# Patient Record
Sex: FEMALE | Race: WHITE | NOT HISPANIC OR LATINO | URBAN - METROPOLITAN AREA
[De-identification: names, ages, dates, MRNs, and addresses within clinical notes are randomized per-mention and may not be internally consistent; named-entity substitution may affect disease eponyms.]

---

## 2020-01-26 ENCOUNTER — EMERGENCY (EMERGENCY)
Facility: HOSPITAL | Age: 85
LOS: 0 days | Discharge: HOME | End: 2020-01-26
Attending: EMERGENCY MEDICINE
Payer: MEDICARE

## 2020-01-26 VITALS
RESPIRATION RATE: 16 BRPM | HEART RATE: 68 BPM | TEMPERATURE: 99 F | OXYGEN SATURATION: 95 % | SYSTOLIC BLOOD PRESSURE: 140 MMHG | DIASTOLIC BLOOD PRESSURE: 74 MMHG

## 2020-01-26 VITALS — WEIGHT: 160.06 LBS | HEIGHT: 68 IN

## 2020-01-26 DIAGNOSIS — M25.572 PAIN IN LEFT ANKLE AND JOINTS OF LEFT FOOT: ICD-10-CM

## 2020-01-26 PROCEDURE — 73590 X-RAY EXAM OF LOWER LEG: CPT | Mod: LT

## 2020-01-26 PROCEDURE — 99284 EMERGENCY DEPT VISIT MOD MDM: CPT | Mod: 25

## 2020-01-26 PROCEDURE — 73630 X-RAY EXAM OF FOOT: CPT | Mod: LT

## 2020-01-26 PROCEDURE — 99284 EMERGENCY DEPT VISIT MOD MDM: CPT

## 2020-01-26 PROCEDURE — 73610 X-RAY EXAM OF ANKLE: CPT | Mod: LT

## 2020-01-26 PROCEDURE — 73610 X-RAY EXAM OF ANKLE: CPT | Mod: 26,LT

## 2020-01-26 PROCEDURE — 73630 X-RAY EXAM OF FOOT: CPT | Mod: 26,LT

## 2020-01-26 PROCEDURE — 73590 X-RAY EXAM OF LOWER LEG: CPT | Mod: 26,LT

## 2020-01-26 RX ORDER — IBUPROFEN 200 MG
600 TABLET ORAL ONCE
Refills: 0 | Status: COMPLETED | OUTPATIENT
Start: 2020-01-26 | End: 2020-01-26

## 2020-01-26 RX ADMIN — Medication 600 MILLIGRAM(S): at 13:39

## 2020-01-26 NOTE — ED STATDOCS - NSFOLLOWUPINSTRUCTIONS_ED_ALL_ED_FT
Ankle Sprain     An ankle sprain is a stretch or tear in a ligament in the ankle. Ligaments are tissues that connect bones to each other.  The two most common types of ankle sprains are:  Inversion sprain. This happens when the foot turns inward and the ankle rolls outward. It affects the ligament on the outside of the foot (lateral ligament).Eversion sprain. This happens when the foot turns outward and the ankle rolls inward. It affects the ligament on the inner side of the foot (medial ligament).What are the causes?  This condition is often caused by accidentally rolling or twisting the ankle.  What increases the risk?  You are more likely to develop this condition if you play sports.  What are the signs or symptoms?  Symptoms of this condition include:  Pain in your ankle.Swelling.Bruising. This may develop right after you sprain your ankle or 1–2 days later.Trouble standing or walking, especially when you turn or change directions.How is this diagnosed?  This condition is diagnosed with:  A physical exam. During the exam, your health care provider will press on certain parts of your foot and ankle and try to move them in certain ways.X-ray imaging. These may be taken to see how severe the sprain is and to check for broken bones.How is this treated?  This condition may be treated with:  A brace or splint. This is used to keep the ankle from moving until it heals.An elastic bandage. This is used to support the ankle.Crutches.Pain medicine.Surgery. This may be needed if the sprain is severe.Physical therapy. This may help to improve the range of motion in the ankle.Follow these instructions at home:  If you have a brace or a splint:     Wear the brace or splint as told by your health care provider. Remove it only as told by your health care provider.Loosen the brace or splint if your toes tingle, become numb, or turn cold and blue.Keep the brace or splint clean.If the brace or splint is not waterproof:  Do not let it get wet. Cover it with a watertight covering when you take a bath or a shower.If you have an elastic bandage (dressing):     Remove it to shower or bathe.Try not to move your ankle much, but wiggle your toes from time to time. This helps to prevent swelling.Adjust the dressing to make it more comfortable if it feels too tight.Loosen the dressing if you have numbness or tingling in your foot, or if your foot becomes cold and blue.Managing pain, stiffness, and swelling        Take over-the-counter and prescription medicines only as told by your health care provider.For 2–3 days, keep your ankle raised (elevated) above the level of your heart as much as possible.If directed, put ice on the injured area:  If you have a removable brace or splint, remove it as told by your health care provider.Put ice in a plastic bag.Place a towel between your skin and the bag.Leave the ice on for 20 minutes, 2–3 times a day.General instructions     Rest your ankle.Do not use the injured limb to support your body weight until your health care provider says that you can. Use crutches as told by your health care provider.Do not use any products that contain nicotine or tobacco, such as cigarettes, e-cigarettes, and chewing tobacco. If you need help quitting, ask your health care provider.Keep all follow-up visits as told by your health care provider. This is important.Contact a health care provider if:  You have rapidly increasing bruising or swelling.Your pain is not relieved with medicine.Get help right away if:  Your foot or toes become numb or blue.You have severe pain that gets worse.Summary  An ankle sprain is a stretch or tear in a ligament in the ankle. Ligaments are tissues that connect bones to each other.This condition is often caused by accidentally rolling or twisting the ankle.Symptoms include pain, swelling, bruising, and trouble walking.To relieve pain and swelling, put ice on the affected ankle, raise your ankle above the level of your heart, and use an elastic bandage.Keep all follow-up visits as told by your health care provider. This is important.This information is not intended to replace advice given to you by your health care provider. Make sure you discuss any questions you have with your health care provider.    FOLLOW UP WITH DR RAZA IN HIS OFFICE ON THURSDAY. CALL THE OFFICE TO MAKE AN APPOINTMENT. RETURN TO ER FOR ANY WORSENING SYMPTOMS OR NEW CONCERNS.

## 2020-01-26 NOTE — ED STATDOCS - PROGRESS NOTE DETAILS
signed Valencia Hunter PA-C Pt seen initially in intake by Dr Regan.   84F c/o let ankle pain for several months, today was walking and felt a "snap" in left lateral malleolus, denies rolling her ankle, but now had pain, swelling, and pain with wt bearing. No apparent fx on xray. Pt sees podiatry Dr Wolfe. Discussed with podiatry resident, will see pt in ED. Pt agrees with plan of  care. signed Valencia Hunter PA-C   Pt seen in ED by podiatry resident, given Schumacher dressing and surgical shoe. WBAT outpt f/u Yaneth 1/30, RICE. Pt feeling well, pt and family agree with DC and plan of care.

## 2020-01-26 NOTE — ED ADULT NURSE NOTE - OBJECTIVE STATEMENT
c/o left foot pain and injury occurring while walking to [employer], pt states while walking fast she felt a "pop" on her left foot, c/o left foot swelling and pain

## 2020-01-26 NOTE — ED ADULT NURSE NOTE - NSIMPLEMENTINTERV_GEN_ALL_ED
Implemented All Fall Risk Interventions:  [place] to call system. Call bell, personal items and telephone within reach. Instruct patient to call for assistance. Room bathroom lighting operational. Non-slip footwear when patient is off stretcher. Physically safe environment: no spills, clutter or unnecessary equipment. Stretcher in lowest position, wheels locked, appropriate side rails in place. Provide visual cue, wrist band, yellow gown, etc. Monitor gait and stability. Monitor for mental status changes and reorient to person, place, and time. Review medications for side effects contributing to fall risk. Reinforce activity limits and safety measures with patient and family.

## 2020-01-26 NOTE — ED STATDOCS - PATIENT PORTAL LINK FT
You can access the FollowMyHealth Patient Portal offered by [facility] by registering at the following website: [email or web address]. By joining [employer]’s FollowMyHealth portal, you will also be able to view your health information using other applications (apps) compatible with our system.

## 2020-01-26 NOTE — ED ADULT TRIAGE NOTE - CHIEF COMPLAINT QUOTE
pt presents to ED c/o L ankle pain intermittently over past 2 months, but worse over past 2 days. pt denies trauma or injury but reports walking a lot over past few days.

## 2020-01-26 NOTE — ED STATDOCS - OBJECTIVE STATEMENT
84 YOF no relevant PMHx presents to the ED c/o left ankle pain since this AM. Pt has had intermittent left ankle pain for past few months. Today pt was rushing to [employer] and heard a snap in left ankle. Pt says it "stopped her in her tracks." Pt poorly able to bear weight, says she feels sharp pain in left ankle. Locates pain in lateral aspect of left ankle.

## 2020-01-26 NOTE — ED STATDOCS - CLINICAL SUMMARY MEDICAL DECISION MAKING FREE TEXT BOX
signed Valencia Hunter PA-C   Pt seen in ED by podiatry resident, given Schumacher dressing and surgical shoe. WBAT outpt f/u Yaneth 1/30, RICE. Pt feeling well, pt and family agree with DC and plan of care.

## 2024-01-09 ENCOUNTER — EMERGENCY (EMERGENCY)
Facility: HOSPITAL | Age: 89
LOS: 0 days | Discharge: HOME | End: 2024-01-09
Attending: EMERGENCY MEDICINE
Payer: MEDICARE

## 2024-01-09 VITALS
RESPIRATION RATE: 16 BRPM | TEMPERATURE: 99 F | HEART RATE: 89 BPM | SYSTOLIC BLOOD PRESSURE: 131 MMHG | WEIGHT: 149.91 LBS | OXYGEN SATURATION: 96 % | DIASTOLIC BLOOD PRESSURE: 91 MMHG

## 2024-01-09 DIAGNOSIS — Z79.82 LONG TERM (CURRENT) USE OF ASPIRIN: ICD-10-CM

## 2024-01-09 DIAGNOSIS — Z88.8 ALLERGY STATUS TO OTHER DRUGS, MEDICAMENTS AND BIOLOGICAL SUBSTANCES: ICD-10-CM

## 2024-01-09 DIAGNOSIS — I83.899 VARICOSE VEINS OF UNSPECIFIED LOWER EXTREMITY WITH OTHER COMPLICATIONS: ICD-10-CM

## 2024-01-09 DIAGNOSIS — Z88.6 ALLERGY STATUS TO ANALGESIC AGENT: ICD-10-CM

## 2024-01-09 PROBLEM — Z78.9 OTHER SPECIFIED HEALTH STATUS: Chronic | Status: ACTIVE | Noted: 2020-01-27

## 2024-01-09 PROCEDURE — 99282 EMERGENCY DEPT VISIT SF MDM: CPT

## 2024-01-09 PROCEDURE — 99283 EMERGENCY DEPT VISIT LOW MDM: CPT | Mod: FS

## 2024-01-09 NOTE — ED STATDOCS - CPE ED MUSC NORM
Caller would like to discuss an/a Medication for pt. Writer advised caller of callback within 24-72 hours.    Patient Name: Madison Salazar  Caller Name:    Name of Facility: [facility]  Callback Number: [phone], press 0  Best Availability: anytime  Can A Detailed Message Be left?   Fax Number:   Additional Info: Pharmacy is calling asking for a call back to discuss directions on prescription  Did you confirm the message with the caller?: yes    Thank you,  Fiorella Schuler  
Pharmacy called and prescription directions verified.   
Pharmacy calling again, not understanding why they have never received a call back since 11/12/19?    Preferred times to be called: soon    Preferred contact number#  [phone]    
normal...

## 2024-01-09 NOTE — ED STATDOCS - CLINICAL SUMMARY MEDICAL DECISION MAKING FREE TEXT BOX
89 y/o female with a PMHx of varicose veins presents to the ED for evaluation. Pt reports she was showering and noticed blood on the wall. Pt looked at her left leg and noticed bleeding from her varicose vein. On 81mg ASA. No other complaints at this time.

## 2024-01-09 NOTE — ED STATDOCS - CARE PROVIDER_API CALL
Idris Larsen  Surgery  [address]  [place] [address]  Phone: [phone]  Fax: [phone]  Follow Up Time:    Idris Larsen  Surgery  [address]  [place] [address]  Phone: [phone]  Fax: [phone]  Follow Up Time:

## 2024-01-09 NOTE — ED STATDOCS - ATTENDING APP SHARED VISIT CONTRIBUTION OF CARE
Dr. Wilson: I performed a face to face bedside interview with patient regarding history of present illness, review of symptoms and past medical history. I completed an independent physical exam.  I have discussed patient's plan of care with PA.   I agree with note as stated above, having amended the EMR as needed to reflect my findings.   This includes HISTORY OF PRESENT ILLNESS, HIV, PAST MEDICAL/SURGICAL/FAMILY/SOCIAL HISTORY, ALLERGIES AND HOME MEDICATIONS, REVIEW OF SYSTEMS, PHYSICAL EXAM, and any PROGRESS NOTES during the time I functioned as the attending physician for this patient.  GISELLE Wilson DO

## 2024-01-09 NOTE — ED STATDOCS - PATIENT PORTAL LINK FT
You can access the FollowMyHealth Patient Portal offered by [facility] by registering at the following website: [email or web address]. By joining [employer]s FollowMyHealth portal, you will also be able to view your health information using other applications (apps) compatible with our system. You can access the FollowMyHealth Patient Portal offered by [facility] by registering at the following website: [email or web address]. By joining [employer]’s FollowMyHealth portal, you will also be able to view your health information using other applications (apps) compatible with our system.

## 2024-01-09 NOTE — ED STATDOCS - NSFOLLOWUPINSTRUCTIONS_ED_ALL_ED_FT
Bleeding Varicose Veins  Varicose veins are veins that have become enlarged and twisted due to damaged valves in the veins. Valves in the veins help return blood from the vein to the heart. If these valves are damaged, blood flows backward and backs up into the veins. This causes increased pressure within the veins, which may cause the veins to rupture and bleed.    Bleeding of the varicose veins can be internal or external. External bleeding is bleeding outside the skin and can be caused from a nick or scratch in the skin on top of the varicose vein. Internal bleeding occurs under the skin and often results from direct trauma to the varicose vein, like tripping, falling, or bumping the vein on furniture.    Intradermal bleeding is another form of bleeding that can occur with small varicose veins called spider veins. In this situation, the rupture of a spider vein occurs underneath the skin surface, creating a blue-purple bruise that is visible through the skin. This problem is normally mild and usually gets better on its own without treatment.    What are the causes?  This condition may be caused by a cut or direct hit to the skin over the varicose vein. In some cases, bleeding can occur without direct trauma to the vein. This can result from:  Thinning and stretching of the skin that covers the varicose veins (hypoplasia).  Weak, thinning vein walls.  High blood pressure in the veins, due to the backup of blood that normally flows back to the heart.  A growth in the pelvis (pelvic mass) that affects the veins in the legs.  Pregnancy and childbirth.  Blood clots, especially in deep veins (thrombophlebitis).  What increases the risk?  You are more likely to develop this condition if you:  Have a family history of varicose veins.  Are on your feet a lot or are standing for long periods of time.  Are pregnant or have had a previous pregnancy.  Are overweight.  Use birth control pills (oral contraceptives).  Have an inactive (sedentary) lifestyle.  Have a history of deep vein thrombosis (DVT).  What are the signs or symptoms?  If bleeding occurs internally, or under the skin, symptoms may include:  Blue or purple discoloration in the skin that spreads beyond the veins.  Itchy and discolored skin.  Heaviness, aching and throbbing pain, and cramps in the legs, especially after long periods of standing, wearing tight clothing, or being in a hot climate.  Severe skin dryness (varicose eczema).  A burning sensation.  Dizziness and sometimes fainting.  External bleeding occurs on the surface of the skin, most often after a cut, scrape, bruise, or other physical trauma. When this occurs, it is crucial to put pressure on the vein and stop the bleeding.    How is this diagnosed?  This condition may be diagnosed based on your symptoms, including when you first noticed any bleeding or pain.    How is this treated?  Treatment may include:  Stopping bleeding by applying pressure to the vein with a clean towel, bandage, or fabric.  Stopping swelling by applying pressure (compression) to the area over a longer period of time. This may be done by applying an elastic bandage or wearing compression stockings.  Raising (elevating) your leg above the level of your heart for 30 minutes a few times a day.  Follow these instructions at home:  Compression stockings on a person's lower legs.  Medicines    Take and use over-the-counter and prescription medicines and creams only as told by your health care provider.  If you were prescribed an antibiotic cream, use it as told by your health care provider. Do not stop using the antibiotic even if your condition improves.  Lifestyle    A couple holding hands and walking.  Do not use any products that contain nicotine or tobacco. These products include cigarettes, chewing tobacco, and vaping devices, such as e-cigarettes. If you need help quitting, ask your health care provider.  Exercise regularly and do exercises as told by your health care provider.  If directed, work with your health care provider to lose weight.  General instructions    Wear compression stockings or apply elastic bandages or any wraps as told by your health care provider. These help to prevent blood clots and reduce swelling in your legs.  Try to avoid sitting or standing for long periods of time. If you need to sit or stand for a long time, move around often to maintain blood flow (circulation).  Elevate your legs above the level of your heart for 30 minutes, 4 times a day, or as often as directed. To do this, lie down with your leg propped up on a pillow or cushion so that your foot is above heart level. Doing this regularly can help prevent more bleeding from developing.  Check your skin every day for new sores and signs of bleeding.  Avoid wearing high-heeled shoes and tight clothing, especially around your limbs and waist.  Be careful in situations where you could cut your legs, such as when shaving or gardening. This can help prevent bleeding.  Contact a health care provider if:  Your veins bleed above or under your skin.  You have pain that gets worse.  The area around a varicose vein becomes warm, red, or tender to the touch.  You develop new sores or a rash near your varicose veins.  You have a sore that does not heal, gets infected, or gets bigger.  You have bad-smelling, yellowish fluid coming from a spot where there was external bleeding.  You have a fever.  Get help right away if:  You have chest pain.  You have trouble breathing.  You have severe leg pain.  Your legs and feet are turning blue or black.  Your legs swell and harden.  You have bleeding from your varicose veins that does not stop.  You have dizziness or you have fainted.  Summary  Varicose veins are veins that have become enlarged and twisted due to damaged valves in the veins. They may bleed under the skin (internal) or on the surface of the skin (external).  Treatment may include pressure on the vein, elevating your legs, wearing compression stockings, and lifestyle changes.  You should exercise regularly, maintain a healthy weight, and avoid smoking.  Check your skin every day for new sores, signs of bleeding, or other problems.  This information is not intended to replace advice given to you by your health care provider. Make sure you discuss any questions you have with your health care provider.

## 2024-01-09 NOTE — ED STATDOCS - PROGRESS NOTE DETAILS
No bleeding at varicosity.  DC with follow up outpatient vascular.  REturn for any worsening symptoms.  Coral Phillips PA-C Pt. is an 88 year old female Hx varicose veins, presnts to ED with bleeding from LLE.  Pt. was in the shower and she looked at her tub and there was blood.  Pt. states her leg was bleeding however denies trauma.  Pt. is on baby ASA daily.  No bleeding in ED at presenation.  Coral Phillips PA-C

## 2024-01-11 ENCOUNTER — APPOINTMENT (OUTPATIENT)
Dept: VASCULAR SURGERY | Facility: CLINIC | Age: 89
End: 2024-01-11
Payer: MEDICARE

## 2024-01-11 PROCEDURE — 99202 OFFICE O/P NEW SF 15 MIN: CPT

## 2024-01-24 ENCOUNTER — APPOINTMENT (OUTPATIENT)
Dept: VASCULAR SURGERY | Facility: CLINIC | Age: 89
End: 2024-01-24
Payer: MEDICARE

## 2024-01-24 VITALS
BODY MASS INDEX: 26.37 KG/M2 | HEART RATE: 70 BPM | DIASTOLIC BLOOD PRESSURE: 84 MMHG | OXYGEN SATURATION: 95 % | SYSTOLIC BLOOD PRESSURE: 154 MMHG | HEIGHT: 67 IN | WEIGHT: 168 LBS

## 2024-01-24 DIAGNOSIS — I83.10 VARICOSE VEINS OF UNSPECIFIED LOWER EXTREMITY WITH INFLAMMATION: ICD-10-CM

## 2024-01-24 PROBLEM — Z86.79 PERSONAL HISTORY OF OTHER DISEASES OF THE CIRCULATORY SYSTEM: Chronic | Status: ACTIVE | Noted: 2024-01-14

## 2024-01-24 PROCEDURE — 99202 OFFICE O/P NEW SF 15 MIN: CPT

## 2024-01-24 PROCEDURE — 93971 EXTREMITY STUDY: CPT | Mod: LT

## 2024-01-24 NOTE — ASSESSMENT
[FreeTextEntry1] : 89yo female with bleeding spider vein now treated with sclerotherapy without recurrence however she has a large bundle of similar veins in same extremity I believe she would benefit from repeated sclerotherapy given symptomatic spider veins. Discussed risks benefits and alternatives; will schedule for sclerotherapy

## 2024-01-24 NOTE — PHYSICAL EXAM
[JVD] : no jugular venous distention  [Normal Breath Sounds] : Normal breath sounds [Normal Rate and Rhythm] : normal rate and rhythm [2+] : left 2+ [de-identified] : well appearing [de-identified] : wnl [FreeTextEntry1] : Left lateral calf/knee bundle of tender spider veins

## 2024-01-24 NOTE — HISTORY OF PRESENT ILLNESS
[FreeTextEntry1] :   Review of medical records, including venous ultrasound, noting correct procedure including site and side. The provider verifies presence and review of imaging studies and/or written report of imaging studies. Agreement on the procedure to be performed.  Time out completed.  The left leg was prepped with concentrated alcohol. Notable ulcerated calf spider vein. Using sterile method a mixture of 0.5% Polidocanol was prepared a total of 4ml was mixed. Using 28/30G needle under direct visualization areas of concern were injected. Each area injected with approximately 0.25-0.5ml. A total of about 10-13injections done. After each injection pressure was held for approximately 3-5minutes.   After assuring hemostasis, a sterile 4x4 was placed on the access site and an ACE compression wrap was applied. Patient tolerated procedure well. Patient was given post-procedure instructions and follow up appointment was scheduled. [de-identified] : Here for follow-up after sclerotherapy for bleeding varix has been doing relatively well.  The area of sclerotherapy has no scaling no skin peeling and no erythema.  She has no recurrence of bleeding however she has a large bundle of nearly ulcerated spider veins near the previous bleeding site.  She does have tenderness over this area.  She denies any heaviness fatigue and swelling in the leg she wears compression stockings daily

## 2024-02-04 NOTE — PROCEDURE
[FreeTextEntry1] :  The left leg was prepped with concentrated alcohol. Notable ulcerated calf spider vein. Using sterile method a mixture of 0.5% Polidocanol was prepared a total of 4ml was mixed. Using 28/30G needle under direct visualization areas of concern were injected. After assuring hemostasis, a sterile 4x4 was placed on the access site and an ACE compression wrap was applied. Patient tolerated procedure well. Patient was given post-procedure instructions and follow up appointment was scheduled.

## 2024-02-04 NOTE — PHYSICAL EXAM
[JVD] : no jugular venous distention  [Normal Breath Sounds] : Normal breath sounds [Normal Rate and Rhythm] : normal rate and rhythm [2+] : left 2+ [Ankle Swelling (On Exam)] : not present [Varicose Veins Of Lower Extremities] : not present [de-identified] : well appearing ambulates unassisted [de-identified] : wnl [FreeTextEntry1] : left lateral leg ulcerated spider veins with scab

## 2024-02-04 NOTE — ASSESSMENT
[FreeTextEntry1] : 87yo female with bleeding varix/spider  vein left leg treated with sclerotherapy continue compression follow up in 2 weeks for wound check

## 2024-02-04 NOTE — HISTORY OF PRESENT ILLNESS
[FreeTextEntry1] : 89yo healthy female presents with bleeding spider/reticular vein in the left lateral leg. States she was taking a warm shower and started bleeding. She held pressure and was taken by EMS to the ER at that point the bleeding had stopped. She kepts a pressure bandage on the area. She states she's never had bleeding from any varices in the past. She wear compression stockings intermittently and has had previous sclerotherapy for cosmetic reasons. Nutrient

## 2024-03-05 ENCOUNTER — APPOINTMENT (OUTPATIENT)
Dept: VASCULAR SURGERY | Facility: CLINIC | Age: 89
End: 2024-03-05

## 2024-03-27 ENCOUNTER — APPOINTMENT (OUTPATIENT)
Dept: VASCULAR SURGERY | Facility: CLINIC | Age: 89
End: 2024-03-27
Payer: MEDICARE

## 2024-03-27 VITALS
WEIGHT: 165 LBS | HEART RATE: 69 BPM | SYSTOLIC BLOOD PRESSURE: 148 MMHG | HEIGHT: 67 IN | BODY MASS INDEX: 25.9 KG/M2 | DIASTOLIC BLOOD PRESSURE: 89 MMHG | OXYGEN SATURATION: 94 %

## 2024-03-27 DIAGNOSIS — I83.892 VARICOSE VEINS OF LEFT LOWER EXTREMITY WITH OTHER COMPLICATIONS: ICD-10-CM

## 2024-03-27 PROCEDURE — 99213 OFFICE O/P EST LOW 20 MIN: CPT

## 2024-03-27 RX ORDER — PANTOPRAZOLE 40 MG/1
40 TABLET, DELAYED RELEASE ORAL DAILY
Refills: 0 | Status: ACTIVE | COMMUNITY

## 2024-03-27 RX ORDER — MULTIVIT-MIN/FA/LYCOPEN/LUTEIN .4-300-25
TABLET ORAL
Refills: 0 | Status: ACTIVE | COMMUNITY

## 2024-03-27 RX ORDER — PSYLLIUM HUSK 0.4 G
CAPSULE ORAL
Refills: 0 | Status: ACTIVE | COMMUNITY

## 2024-03-27 RX ORDER — ASPIRIN 81 MG/1
81 TABLET ORAL DAILY
Refills: 0 | Status: ACTIVE | COMMUNITY

## 2024-03-27 RX ORDER — METOPROLOL SUCCINATE 50 MG/1
50 TABLET, EXTENDED RELEASE ORAL DAILY
Refills: 0 | Status: ACTIVE | COMMUNITY

## 2024-03-27 RX ORDER — SIMVASTATIN 20 MG/1
20 TABLET, FILM COATED ORAL DAILY
Refills: 0 | Status: ACTIVE | COMMUNITY

## 2024-03-27 RX ORDER — LOSARTAN POTASSIUM 50 MG/1
50 TABLET, FILM COATED ORAL DAILY
Refills: 0 | Status: ACTIVE | COMMUNITY

## 2024-03-27 RX ORDER — CONJUGATED ESTROGENS AND MEDROXYPROGESTERONE ACETATE .3; 1.5 MG/1; MG/1
0.3-1.5 TABLET, SUGAR COATED ORAL
Refills: 0 | Status: ACTIVE | COMMUNITY

## 2024-03-27 NOTE — ASSESSMENT
[FreeTextEntry1] : 88 yr F with symptomatic spider veins of LLE with bleeding; still with a large symptomatic bundle of veins in that extremity Given patient has had a bleeding episode from the spider vein bundle this is a medical necessity to treat; she would require sclerotherapy of these veins to prevent recurrent bleeding events. I discussed the risks benefits and alternatives of the procedure with the patient.   will plan for procedure

## 2024-03-27 NOTE — HISTORY OF PRESENT ILLNESS
[FreeTextEntry1] : 87yo female with bleeding spider veins, treated with sclerotherapy without recurrence; however she has a large bundle of similar veins in same extremity that are still bothering her and still intermittantly bleeding.   1/11/24: LLE repair of bleeding spider vein  [de-identified] : Patient here for follow up of bleeding spider veins. Since last visit had no recurrent episodes of bleeding from these's 3 bundles of spider veins in the left lower extremity.  However still with mild skin bulging itching at the site.  Patient has been actively avoiding hot showers and any trauma to the area for fear of recurrent bleed.  She is compliant with all her medications and compliant with all her medical therapy.

## 2024-03-27 NOTE — PHYSICAL EXAM
[Normal Breath Sounds] : Normal breath sounds [JVD] : no jugular venous distention  [Normal Heart Sounds] : normal heart sounds [2+] : right 2+ [Ankle Swelling (On Exam)] : not present [Varicose Veins Of Lower Extremities] : not present [FreeTextEntry1] : CEAP C-1 several bundles of spider veins  [de-identified] : wnl [de-identified] : well appearing

## 2024-05-22 ENCOUNTER — APPOINTMENT (OUTPATIENT)
Dept: VASCULAR SURGERY | Facility: CLINIC | Age: 89
End: 2024-05-22

## 2024-07-09 ENCOUNTER — APPOINTMENT (OUTPATIENT)
Dept: VASCULAR SURGERY | Facility: CLINIC | Age: 89
End: 2024-07-09
Payer: SELF-PAY

## 2024-07-09 DIAGNOSIS — I83.892 VARICOSE VEINS OF LEFT LOWER EXTREMITY WITH OTHER COMPLICATIONS: ICD-10-CM

## 2024-07-09 PROCEDURE — 36468 NJX SCLRSNT SPIDER VEINS: CPT

## 2024-07-29 ENCOUNTER — APPOINTMENT (OUTPATIENT)
Dept: VASCULAR SURGERY | Facility: CLINIC | Age: 89
End: 2024-07-29
Payer: MEDICARE

## 2024-07-29 VITALS
WEIGHT: 165 LBS | BODY MASS INDEX: 25.9 KG/M2 | SYSTOLIC BLOOD PRESSURE: 148 MMHG | OXYGEN SATURATION: 95 % | HEART RATE: 65 BPM | HEIGHT: 67 IN | DIASTOLIC BLOOD PRESSURE: 82 MMHG

## 2024-07-29 DIAGNOSIS — I83.892 VARICOSE VEINS OF LEFT LOWER EXTREMITY WITH OTHER COMPLICATIONS: ICD-10-CM

## 2024-07-29 PROCEDURE — 99213 OFFICE O/P EST LOW 20 MIN: CPT

## 2024-07-29 NOTE — HISTORY OF PRESENT ILLNESS
[FreeTextEntry1] : 89yo female with bleeding spider veins, treated with sclerotherapy without recurrence; however she has a large bundle of similar veins in same extremity that are still bothering her and still intermittantly bleeding.  1/11/24: LLE repair of bleeding spider vein   Interval History: Patient here for follow up of bleeding spider veins. Since last visit had no recurrent episodes of bleeding from these's 3 bundles of spider veins in the left lower extremity. However still with mild skin bulging itching at the site. Patient has been actively avoiding hot showers and any trauma to the area for fear of recurrent bleed. She is compliant with all her medications and compliant with all her medical therapy.  7/9/24 L UGS  [de-identified] : Following up today s/p sclerotherapy of spider veins bilateral lower extremities (7/9/24); doing well no bleeding since last visit overall stable no scaling or skin irritation

## 2024-07-29 NOTE — PHYSICAL EXAM
[JVD] : no jugular venous distention  [Normal Breath Sounds] : Normal breath sounds [Normal Rate and Rhythm] : normal rate and rhythm [2+] : left 2+ [de-identified] : well appearing [FreeTextEntry1] : improved spider veins some trapped blood evacuated and ace bandage placed